# Patient Record
Sex: MALE
[De-identification: names, ages, dates, MRNs, and addresses within clinical notes are randomized per-mention and may not be internally consistent; named-entity substitution may affect disease eponyms.]

---

## 2018-02-10 ENCOUNTER — HOSPITAL ENCOUNTER (EMERGENCY)
Dept: HOSPITAL 42 - ED | Age: 83
Discharge: HOME | End: 2018-02-10
Payer: MEDICARE

## 2018-02-10 VITALS — TEMPERATURE: 97.5 F | OXYGEN SATURATION: 99 % | RESPIRATION RATE: 18 BRPM

## 2018-02-10 DIAGNOSIS — R41.82: Primary | ICD-10-CM

## 2018-02-10 DIAGNOSIS — G20: ICD-10-CM

## 2018-02-10 NOTE — ED PDOC
Arrival/HPI





- General


Time Seen by Provider: 02/10/18 21:53


Historian: Patient, Spouse





- History of Present Illness


Narrative History of Present Illness (Text): 


you were treated in the ED today for having parkinson's, having progressive 

forgetfulness, and you got out of the house and got lost and brought by the 

authorities and you were otherwise without any head injury/neck pain/fever/

nausea/vomiting/headache/dizziness/difficulty breathing/chest pain/abdomen pain/

numbness/tingling/loss of limb function/thoughts to harm yourself or others or 

hallucinations. you were sitting up, comfortable, alert/oriented to your 

baseline of name/location but not year which your wife/family confirmed, good 

strength/sensation, no abdomen tenderness, pink skin, no fever temp 97.5, 

stable heart rate 80, stable breathing rate 18, excellent oxygen level 99% room 

air, elevated blood pressure 147/78 which we recommend followup primary care 2-

3 days repeat and determine further treatment, observation done in the ED, 

counselled to complete a labwork-up, radiology work-up, ECG but you and your 

family wanted to go home and cautioned for missed diagnosis/complications/death 

but you stated you will followup with your primary care physician 2 days, and 

you went home with wife and family. 1. recommend followup primary care 1-2 days 

to determine further care, referral to neurology clinic. 2. if any worsening 

pain, fever, chills, nausea, vomiting, any medical condition then return to the 

ED.


02/10/18 22:50





02/10/18 22:50





Time/Duration: 1-3 hours


Symptom Onset: Gradual


Symptom Course: Improving


Activities at Onset: Rest


Context: Walking





Past Medical History





- Provider Review


Nursing Documentation Reviewed: Yes





- Travel History


Have you recently traveled outside [place] w/in the past 3 mons?: No





Family/Social History





- Physician Review


Nursing Documentation Reviewed: Yes


Family/Social History: No Known Family HX





Review of Systems





- Review of Systems


Constitutional: Normal


Eyes: Normal


ENT: Normal


Respiratory: Normal


Cardiovascular: Normal


Gastrointestinal: Normal


Genitourinary Male: Normal


Musculoskeletal: Normal


Skin: Normal


Neurological: Other (forgetfullness)


Endocrine: Normal


Hemo/Lymphatic: Normal


Psychiatric: Normal





Physical Exam


Vital Signs Reviewed: Yes


Vital Signs











  Temp Pulse Resp BP Pulse Ox


 


 02/10/18 21:51  97.5 F L  80  18  147/70  99











Temperature: Afebrile


Blood Pressure: Hypertensive


Pulse: Regular


Respiratory Rate: Normal


Appearance: Positive for: Well-Appearing, Non-Toxic, Comfortable


Pain Distress: None


Mental Status: Positive for: other (alert to name/location but stated year 1918 

and family stated this is baseline)





- Systems Exam


Head: Present: Atraumatic, Normocephalic


Pupils: Present: PERRL


Extroacular Muscles: Present: EOMI


Conjunctiva: Present: Normal


Ears: Present: Normal


Mouth: Present: Moist Mucous Membranes


Pharnyx: Present: Normal


Nose (External): Present: Atraumatic


Nose (Internal): Present: Normal Inspection


Neck: Present: Normal Range of Motion, Other


Respiratory/Chest: Present: Clear to Auscultation, Good Air Exchange


Cardiovascular: Present: Regular Rate and Rhythm


Abdomen: No: Tenderness, Distention, Normal Bowel Sounds, Peritoneal Signs, 

Rebound, Guarding, McBurney's Point Tender, Rovsing's Sign Present, Hernias, 

Feeding Tubes, Ostomy Tubes, Mass/Organomegaly, Scars, Other


Back: Present: Normal Inspection, Other (no c-t-l spinal or paraspinal 

tenderness)


Upper Extremity: Present: Normal Inspection


Lower Extremity: Present: Normal Inspection


Neurological: Present: GCS=15, CN II-XII Intact, Speech Normal, Motor Func 

Grossly Intact


Skin: Present: Warm, Normal Color


Psychiatric: Present: Alert, Normal Insight, Normal Concentration, Other (

baseline mental status)





Medical Decision Making


ED Course and Treatment: 


you were treated in the ED today for having parkinson's, having progressive 

forgetfulness, and you got out of the house and got lost and brought by the 

authorities and you were otherwise without any head injury/neck pain/fever/

nausea/vomiting/headache/dizziness/difficulty breathing/chest pain/abdomen pain/

numbness/tingling/loss of limb function/thoughts to harm yourself or others or 

hallucinations. you were sitting up, comfortable, alert/oriented to your 

baseline of name/location but not year which your wife/family confirmed, good 

strength/sensation, no abdomen tenderness, laughing/joking with family, pink 

skin, no fever temp 97.5, stable heart rate 80, stable breathing rate 18, 

excellent oxygen level 99% room air, elevated blood pressure 147/78 which we 

recommend followup primary care 2-3 days repeat and determine further treatment

, observation done in the ED, counselled to complete a labwork-up, radiology 

work-up, ECG but you and your family wanted to go home and cautioned for missed 

diagnosis/complications/death but you stated you will followup with your 

primary care physician 2 days, and you went home with wife and family. 1. 

recommend followup primary care 1-2 days to determine further care, referral to 

neurology clinic. 2. if any worsening pain, fever, chills, nausea, vomiting, 

any medical condition then return to the ED.


02/10/18 22:55





Reassessment Condition: Improved





Disposition/Present on Arrival





- Present on Arrival


Any Indicators Present on Arrival: No





- Disposition


Have Diagnosis and Disposition been Completed?: Yes


Diagnosis: 


 Forgetfulness





Disposition: HOME/ ROUTINE


Disposition Time: 22:56


Patient Plan: Discharge


Condition: IMPROVED


Additional Instructions: 


you were treated in the ED today for having parkinson's, having progressive 

forgetfulness, and you got out of the house and got lost and brought by the 

authorities and you were otherwise without any head injury/neck pain/fever/

nausea/vomiting/headache/dizziness/difficulty breathing/chest pain/abdomen pain/

numbness/tingling/loss of limb function/thoughts to harm yourself or others or 

hallucinations. you were sitting up, comfortable, alert/oriented to your 

baseline of name/location but not year which your wife/family confirmed, good 

strength/sensation, no abdomen tenderness, laughing/joking with family, pink 

skin, no fever temp 97.5, stable heart rate 80, stable breathing rate 18, 

excellent oxygen level 99% room air, elevated blood pressure 147/78 which we 

recommend followup primary care 2-3 days repeat and determine further treatment

, observation done in the ED, counselled to complete a labwork-up, radiology 

work-up, ECG but you and your family wanted to go home and cautioned for missed 

diagnosis/complications/death but you stated you will followup with your 

primary care physician 2 days, and you went home with wife and family. 1. 

recommend followup primary care 1-2 days to determine further care, referral to 

neurology clinic. 2. if any worsening pain, fever, chills, nausea, vomiting, 

any medical condition then return to the ED.


Referrals: 


Monica Wood, [Primary Care Provider] - Follow up with primary

## 2018-02-11 VITALS — HEART RATE: 78 BPM | SYSTOLIC BLOOD PRESSURE: 137 MMHG | DIASTOLIC BLOOD PRESSURE: 70 MMHG

## 2018-06-29 ENCOUNTER — HOSPITAL ENCOUNTER (OUTPATIENT)
Dept: HOSPITAL 42 - ED | Age: 83
Setting detail: OBSERVATION
LOS: 1 days | Discharge: HOME | End: 2018-06-30
Attending: INTERNAL MEDICINE | Admitting: INTERNAL MEDICINE
Payer: MEDICARE

## 2018-06-29 VITALS — HEART RATE: 74 BPM

## 2018-06-29 VITALS — BODY MASS INDEX: 20.3 KG/M2

## 2018-06-29 DIAGNOSIS — F02.80: ICD-10-CM

## 2018-06-29 DIAGNOSIS — I10: ICD-10-CM

## 2018-06-29 DIAGNOSIS — Z95.5: ICD-10-CM

## 2018-06-29 DIAGNOSIS — I25.2: ICD-10-CM

## 2018-06-29 DIAGNOSIS — E78.5: ICD-10-CM

## 2018-06-29 DIAGNOSIS — G20: ICD-10-CM

## 2018-06-29 DIAGNOSIS — E86.0: Primary | ICD-10-CM

## 2018-06-29 DIAGNOSIS — K59.00: ICD-10-CM

## 2018-06-29 DIAGNOSIS — E11.9: ICD-10-CM

## 2018-06-29 DIAGNOSIS — E03.9: ICD-10-CM

## 2018-06-29 DIAGNOSIS — I25.10: ICD-10-CM

## 2018-06-29 LAB
ALBUMIN SERPL-MCNC: 3.6 G/DL (ref 3–4.8)
ALBUMIN/GLOB SERPL: 1.3 {RATIO} (ref 1.1–1.8)
ALT SERPL-CCNC: 15 U/L (ref 7–56)
APPEARANCE UR: CLEAR
APTT BLD: 31.5 SECONDS (ref 25.1–36.5)
AST SERPL-CCNC: 20 U/L (ref 17–59)
BACTERIA #/AREA URNS HPF: (no result) /[HPF]
BASE EXCESS BLDV CALC-SCNC: 4.8 MMOL/L (ref 0–2)
BASOPHILS # BLD AUTO: 0.01 K/MM3 (ref 0–2)
BASOPHILS NFR BLD: 0.2 % (ref 0–3)
BILIRUB UR-MCNC: NEGATIVE MG/DL
BUN SERPL-MCNC: 15 MG/DL (ref 7–21)
CALCIUM SERPL-MCNC: 9.1 MG/DL (ref 8.4–10.5)
COLOR UR: YELLOW
EOSINOPHIL # BLD: 0.2 10*3/UL (ref 0–0.7)
EOSINOPHIL NFR BLD: 2.5 % (ref 1.5–5)
ERYTHROCYTE [DISTWIDTH] IN BLOOD BY AUTOMATED COUNT: 14 % (ref 11.5–14.5)
GFR NON-AFRICAN AMERICAN: > 60
GLUCOSE UR STRIP-MCNC: NEGATIVE MG/DL
GRANULOCYTES # BLD: 4.17 10*3/UL (ref 1.4–6.5)
GRANULOCYTES NFR BLD: 66.2 % (ref 50–68)
HGB BLD-MCNC: 13 G/DL (ref 14–18)
INR PPP: 1.25 (ref 0.93–1.08)
LEUKOCYTE ESTERASE UR-ACNC: NEGATIVE LEU/UL
LYMPHOCYTES # BLD: 1.5 10*3/UL (ref 1.2–3.4)
LYMPHOCYTES NFR BLD AUTO: 24.1 % (ref 22–35)
MCH RBC QN AUTO: 30.5 PG (ref 25–35)
MCHC RBC AUTO-ENTMCNC: 32.8 G/DL (ref 31–37)
MCV RBC AUTO: 93 FL (ref 80–105)
MONOCYTES # BLD AUTO: 0.4 10*3/UL (ref 0.1–0.6)
MONOCYTES NFR BLD: 7 % (ref 1–6)
PH BLDV: 7.34 [PH] (ref 7.32–7.43)
PH UR STRIP: 6 [PH] (ref 4.7–8)
PLATELET # BLD: 200 10^3/UL (ref 120–450)
PMV BLD AUTO: 8.8 FL (ref 7–11)
PROT UR STRIP-MCNC: (no result) MG/DL
PROTHROMBIN TIME: 14.4 SECONDS (ref 9.4–12.5)
RBC # BLD AUTO: 4.26 10^6/UL (ref 3.5–6.1)
RBC # UR STRIP: NEGATIVE /UL
RBC #/AREA URNS HPF: NEGATIVE /HPF (ref 0–2)
SP GR UR STRIP: 1.02 (ref 1–1.03)
TROPONIN I SERPL-MCNC: < 0.01 NG/ML
UROBILINOGEN UR STRIP-ACNC: 1 E.U./DL
VENOUS BLOOD FIO2: 21 %
VENOUS BLOOD GAS PCO2: 60 (ref 40–60)
VENOUS BLOOD GAS PO2: 42 MM/HG (ref 30–55)
WBC # BLD AUTO: 6.3 10^3/UL (ref 4.5–11)

## 2018-06-29 PROCEDURE — 83615 LACTATE (LD) (LDH) ENZYME: CPT

## 2018-06-29 PROCEDURE — 99285 EMERGENCY DEPT VISIT HI MDM: CPT

## 2018-06-29 PROCEDURE — 83735 ASSAY OF MAGNESIUM: CPT

## 2018-06-29 PROCEDURE — 82948 REAGENT STRIP/BLOOD GLUCOSE: CPT

## 2018-06-29 PROCEDURE — 70450 CT HEAD/BRAIN W/O DYE: CPT

## 2018-06-29 PROCEDURE — 82550 ASSAY OF CK (CPK): CPT

## 2018-06-29 PROCEDURE — 96361 HYDRATE IV INFUSION ADD-ON: CPT

## 2018-06-29 PROCEDURE — 36415 COLL VENOUS BLD VENIPUNCTURE: CPT

## 2018-06-29 PROCEDURE — 87040 BLOOD CULTURE FOR BACTERIA: CPT

## 2018-06-29 PROCEDURE — 80048 BASIC METABOLIC PNL TOTAL CA: CPT

## 2018-06-29 PROCEDURE — 93005 ELECTROCARDIOGRAM TRACING: CPT

## 2018-06-29 PROCEDURE — 81001 URINALYSIS AUTO W/SCOPE: CPT

## 2018-06-29 PROCEDURE — 82803 BLOOD GASES ANY COMBINATION: CPT

## 2018-06-29 PROCEDURE — 85025 COMPLETE CBC W/AUTO DIFF WBC: CPT

## 2018-06-29 PROCEDURE — 71045 X-RAY EXAM CHEST 1 VIEW: CPT

## 2018-06-29 PROCEDURE — 85610 PROTHROMBIN TIME: CPT

## 2018-06-29 PROCEDURE — 85730 THROMBOPLASTIN TIME PARTIAL: CPT

## 2018-06-29 PROCEDURE — 84484 ASSAY OF TROPONIN QUANT: CPT

## 2018-06-29 PROCEDURE — 96374 THER/PROPH/DIAG INJ IV PUSH: CPT

## 2018-06-29 PROCEDURE — 80053 COMPREHEN METABOLIC PANEL: CPT

## 2018-06-29 NOTE — RAD
HISTORY:

AMS  



COMPARISON:

No prior. 



FINDINGS:



LUNGS:

Mild bibasilar atelectasis



PLEURA:

No significant pleural effusion identified, no pneumothorax apparent.



CARDIOVASCULAR:

Normal.



OSSEOUS STRUCTURES:

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 

The Posterior fixation hardware over the upper lumbar spine is 

present.



VISUALIZED UPPER ABDOMEN:

Normal.



OTHER FINDINGS:

None.



IMPRESSION:

Mild bibasilar atelectasis.

## 2018-06-29 NOTE — CARD
--------------- APPROVED REPORT --------------





EKG Measurement

Heart [id number]

MN [id number]

[id number]

[id number]

[id number]



<Conclusion>

Sinus rhythm with 1st degree AV block

Otherwise normal ECG

## 2018-06-29 NOTE — CT
PROCEDURE:  CT HEAD WITHOUT CONTRAST.



HISTORY:

AMS



COMPARISON:

Comparison made with prior MRI of the brain 07/07/2014. 



TECHNIQUE:

Axial computed tomography images were obtained through the head/brain 

without intravenous contrast.  



Radiation dose:



Total exam DLP = 1007.15 MGy-cm.



This CT exam was performed using one or more of the following dose 

reduction techniques: Automated exposure control, adjustment of the 

mA and/or kV according to patient size, and/or use of iterative 

reconstruction technique.



FINDINGS:



HEMORRHAGE:

No acute parenchymal, subarachnoid or extra-axial hemorrhage. 



BRAIN:

Mild chronic periventricular white matter ischemic changes seen 

extending peripherally into the deep and subcortical white matter 

both cerebral hemispheres. Note that the possibility of a small 

hyperacute infarct cannot completely excluded. .  There may also be a 

few tiny chronic bilateral basal nuclei lacunar type infarcts. 



Moderate central volume loss. 



VENTRICLES:

No obstructive hydrocephalus. 



CALVARIUM:

There are no acute calvarial fractures.



PARANASAL SINUSES:

Unremarkable as visualized. No significant inflammatory changes.



MASTOID AIR CELLS:

Left mastoid air complex is sclerotic/underpneumatized.  Residual 

left-sided mastoid air cells are opacified. .



OTHER FINDINGS:

None.



IMPRESSION:

No acute intracranial hemorrhage.  



Mild chronic white matter ischemic changes. .  Note that the 

possibility of a small hyperacute infarct cannot be excluded.  

Clinical correlation recommended.



Moderate central volume loss

## 2018-06-29 NOTE — ED PDOC
Arrival/HPI





- General


Historian: Patient





- History of Present Illness


Time/Duration: < week


Symptom Onset: Gradual


Symptom Course: Unchanged


Context: Home





- General


Chief Complaint: Weakness/Neurological Deficit


Time Seen by Provider: 06/29/18 09:07





- History of Present Illness


Narrative History of Present Illness (Text): 





06/29/18 09:33


Pt is an 87 yo M with PMH of HTN, CAD s/p 2 stents, hypothyroidism, Parkinson's

, dementia, DM, and urinary incontinence sent to ED by PMD for lethargy. 

Patient is poor historian and only oriented to self. Patient's wife at bedside 

provided history. She states that over the 4-5 days he has had poor urine 

output. PMD was contacted and patient was prescribed ciprofloxacin, which he 

completed, but symptoms did not resolve. At baseline, patient is able to 

ambulate and converse with some limitations. However, patient has been less 

talkative over the last few days. ROS limited due to patient's current mental 

status.





PMD: Abdoulaye Schofield)





Past Medical History





- Provider Review


Nursing Documentation Reviewed: Yes





- Infectious Disease


Hx of Infectious Diseases: None





- Cardiac


Hx Cardiac Disorders: Yes


Hx Hypertension: Yes





- Pulmonary


Hx Respiratory Disorders: No





- Neurological


Hx Neurological Disorder: Yes


Hx Parkinson's Disease: Yes





- HEENT


Hx HEENT Disorder: No





- Renal


Hx Renal Disorder: No





- Endocrine/Metabolic


Hx Endocrine Disorders: Yes


Hx Diabetes Mellitus Type 2: Yes





- Hematological/Oncological


Hx Blood Disorders: No





- Integumentary


Hx Dermatological Disorder: No





- Musculoskeletal/Rheumatological


Hx Musculoskeletal Disorders: Yes


Hx Falls: Yes


Hx Unsteady Gait: Yes





- Gastrointestinal


Hx Gastrointestinal Disorders: Yes


Hx Gall Bladder Disease: Yes





- Genitourinary/Gynecological


Hx Genitourinary Disorders: Yes


Hx Urinary Tract Infection: Yes





- Psychiatric


Hx Psychophysiologic Disorder: No


Hx Substance Use: No





- Surgical History


Hx Cholecystectomy: Yes





Family/Social History





- Physician Review


Nursing Documentation Reviewed: Yes


Family/Social History: No Known Family HX


Smoking Status: Unknown If Ever Smoked


Hx Alcohol Use: No


Hx Substance Use: No





Allergies/Home Meds


Allergies/Adverse Reactions: 


Allergies





No Known Allergies Allergy (Verified 06/29/18 09:24)


 








Home Medications: 


 Home Meds











 Medication  Instructions  Recorded  Confirmed


 


Aspirin [Aspirin Chewable] 81 mg PO DAILY 06/29/18 06/29/18


 


Carbidopa/Levodopa 1 tab PO TID 06/29/18 06/29/18





[Carbidopa-Levodopa  Tab]   


 


Clonazepam 0.5 mg PO BID 06/29/18 06/29/18


 


Donepezil [Aricept] 10 mg PO DAILY 06/29/18 06/29/18


 


Haloperidol [Haldol] 0.5 mg PO DAILY 06/29/18 06/29/18


 


Levothyroxine [Synthroid] 0.075 mg PO DAILY 06/29/18 06/29/18


 


Losartan [Cozaar] 50 mg PO DAILY 06/29/18 06/29/18


 


Memantine [Namenda] 10 mg PO BID 06/29/18 06/29/18


 


Omeprazole Magnesium [Prilosec] 20 mg PO DAILY 06/29/18 06/29/18


 


Oxybutynin [Oxybutynin Chloride] 5 mg PO BID 06/29/18 06/29/18


 


Tamsulosin [Flomax] 0.4 mg PO DAILY 06/29/18 06/29/18


 


metFORMIN [glucOPHAGE] 500 mg PO BID 06/29/18 06/29/18














Review of Systems





- Review of Systems


Systems not reviewed;Unavailable: Altered Mental Status





Physical Exam





- Physical Exam


Physical Exam Limitations: Altered Mental Status


Vital Signs Reviewed: Yes


Temperature: Afebrile


Blood Pressure: Normal


Pulse: Regular


Respiratory Rate: Normal


Appearance: Positive for: Ill-Appearing, Cachectic


Pain Distress: None


Mental Status: Positive for: Confused, Lethargic





- Systems Exam


Head: Present: Atraumatic, Normocephalic


Conjunctiva: Present: Normal


Mouth: No: Moist Mucous Membranes (dry)


Neck: Present: Trachea Midline.  No: MIDLINE TENDERNESS, Paraspinal Tenderness


Respiratory/Chest: Present: Clear to Auscultation, Good Air Exchange.  No: 

Respiratory Distress, Accessory Muscle Use, Wheezes, Rales, Rhonchi


Cardiovascular: Present: Regular Rate and Rhythm, Normal S1, S2.  No: Murmurs


Abdomen: No: Tenderness, Distention, Peritoneal Signs, Rebound, Guarding


Back: Present: Normal Inspection


Upper Extremity: Present: Normal Inspection.  No: Cyanosis, Edema


Lower Extremity: Present: Normal Inspection.  No: Edema


Neurological: No: GCS=15 (12), Speech Normal


Skin: Present: Warm, Dry, Normal Color, Other (poor skin turgor).  No: Rashes


Psychiatric: Present: Alert, Normal Insight, Normal Concentration.  No: 

Oriented x 3 (to self only)





Vital Signs











  Temp Pulse Resp BP Pulse Ox


 


 06/29/18 12:22   63  16  113/69  99


 


 06/29/18 09:18  98 F  66  18  127/63  97














Medical Decision Making


ED Course and Treatment: 





06/29/18 09:54


87 yo M presents with lethargy.





Plan:


- CBC, CMP


- Coags


- Cardiac ISO


- Cultures


- VBG shock


- UA


- EKG


- CXR


- Head CT


- IVF


- Reassess and disposition





06/29/18 09:55


EKG reviewed shows rate of 66, sinus rhythm with 1st degree AV block.





06/29/18 10:22


Chest X-ray


Impression: Mild bibasilar atelectasis.





06/29/18 11:44


Head CT without contrast


Impression: No acute intracranial hemorrhage. Mild chronic white matter 

ischemic changes. Note that the possibility of a small hyperacute infarct 

cannot be excluded. Clinical correlation recommended. Moderate central volume 

loss.





Labwork and imaging grossly normal. Case discussed with Dr. Galarza, who 

requests overnight observation under his service due to altered mental status. 

Patient admitted for observation to med/surg.


 (Abdoulaye Lauren)








06/29/18 14:31


pts sent in by pmd for eval for ams. labs ct neg. ua neg. pmd requests obs. 

vitals stable pt dni dnr (Earl Moore)





- Lab Interpretations


Lab Results: 











 06/29/18 10:00 





 06/29/18 10:00 





 Lab Results





06/29/18 10:00: pO2 42, VBG pH 7.34, VBG pCO2 60.0, VBG HCO3 32.4 H, VBG Total 

CO2 34.2 H, VBG O2 Sat (Calc) 83.4 H, VBG Base Excess 4.8 H, VBG Potassium 3.7, 

Sodium 140.0, Chloride 105.0, Glucose 94, Lactate 0.9, FiO2 21.0, Venous Blood 

Potassium 3.7


06/29/18 10:00: PT 14.4 H, INR 1.25 H, APTT 31.5


06/29/18 10:00: Sodium 143, Chloride 104, Potassium 3.9, Carbon Dioxide 30, 

Anion Gap 13, BUN 15, Creatinine 0.8, Est GFR (African Amer) > 60, Est GFR (Non-

Af Amer) > 60, Random Glucose 96, Calcium 9.1, Magnesium 2.1, Total Bilirubin 

0.9, AST 20, ALT 15, Alkaline Phosphatase 55, Lactate Dehydrogenase 327 L, 

Total Creatine Kinase 40, Troponin I < 0.01, Total Protein 6.3, Albumin 3.6, 

Globulin 2.7, Albumin/Globulin Ratio 1.3


06/29/18 10:00: WBC 6.3, RBC 4.26, Hgb 13.0 L, Hct 39.6 L, MCV 93.0, MCH 30.5, 

MCHC 32.8, RDW 14.0, Plt Count 200, MPV 8.8, Gran % 66.2, Lymph % (Auto) 24.1, 

Mono % (Auto) 7.0 H, Eos % (Auto) 2.5, Baso % (Auto) 0.2, Gran # 4.17, Lymph # (

Auto) 1.5, Mono # (Auto) 0.4, Eos # (Auto) 0.2, Baso # (Auto) 0.01


06/29/18 09:33: POC Glucose (mg/dL) 90


06/29/18 09:26: Urine Color Yellow, Urine Appearance Clear, Urine pH 6.0, Ur 

Specific Gravity 1.025, Urine Protein Trace H, Urine Glucose (UA) Negative, 

Urine Ketones Trace H, Urine Blood Negative, Urine Nitrate Negative, Urine 

Bilirubin Negative, Urine Urobilinogen 1.0 H, Ur Leukocyte Esterase Negative, 

Urine RBC Negative, Urine WBC 2 - 5, Ur Epithelial Cells 3 - 4, Urine Bacteria 

Few











- RAD Interpretation


Radiology Orders: 











06/29/18 09:28


CHEST PORTABLE [RAD] Stat 





06/29/18 09:36


HEAD W/O CONTRAST [CT] Stat 














- Medication Orders


Current Medication Orders: 











Aspirin (Aspirin Chewable)  81 mg PO DAILY [facility]


Carbidopa/Levodopa (Sinemet)  1 tab PO 0800,1000,1400,1600 [facility]


   Last Admin: 06/29/18 13:40  Dose: 1 tab





Donepezil HCl (Aricept)  10 mg PO HS [facility]


Sodium Chloride (Sodium Chloride 0.9%)  1,000 mls @ 60 mls/hr IV .[id number] [facility]


   Last Admin: 06/29/18 13:40  Dose: 60 mls/hr





eMAR Start Stop


 Document     06/29/18 13:40  RV  (Rec: 06/29/18 13:40  RV  [id number])


     Intravenous Solution


      Start Date                                 06/29/18


      Start Time                                 13:40





Levothyroxine Sodium (Synthroid)  75 mcg PO 0600 [facility]


Losartan Potassium (Cozaar)  50 mg PO DAILY [facility]


Memantine (Namenda)  10 mg PO BID [facility]


Metformin HCl (Glucophage)  500 mg PO BID [facility]


Pantoprazole Sodium (Protonix Inj)  40 mg IVP DAILY [facility]


Tamsulosin HCl (Flomax)  0.4 mg PO DAILY [facility]





Discontinued Medications





Carbidopa/Levodopa (Sinemet)  1 tab PO STAT [facility]


   Stop: 06/29/18 11:14


   Last Admin: 06/29/18 11:51  Dose: 1 tab





Sodium Chloride (Sodium Chloride 0.9%)  1,000 mls @ 999 mls/hr IV .Q1H1M [facility]


   Stop: 06/29/18 10:35


   Last Admin: 06/29/18 09:47  Dose: 999 mls/hr





eMAR Start Stop


 Document     06/29/18 09:47  JASMYN  (Rec: 06/29/18 09:49  JASMYNAsc[facility] Stand[facility] [id number])


     Intravenous Solution


      Start Date                                 06/29/18


      Start Time                                 09:48


      End Date                                   06/29/18


      End time                                   10:48


      Total Infusion Time                        60





Sodium Chloride (Sodium Chloride 0.9%)  1,000 mls @ 100 mls/hr IV .Q10H [facility]


   Last Admin: 06/29/18 11:52  Dose: 100 mls/hr





eMAR Start Stop


 Document     06/29/18 11:52  JASMYN  (Rec: 06/29/18 11:52  JASMYN  [facility] [id number])


     Intravenous Solution


      Start Date                                 06/29/18


      Start Time                                 11:52














Disposition/Present on Arrival





- Present on Arrival


Any Indicators Present on Arrival: No


History of DVT/PE: No


History of Uncontrolled Diabetes: No


Urinary Catheter: No


History of Decub. Ulcer: No


History Surgical Site Infection Following: None





- Disposition


Have Diagnosis and Disposition been Completed?: Yes


Disposition Time: 12:11


Patient Plan: Admission





- Disposition


Diagnosis: 


 Altered mental status





Disposition: HOSPITALIZED


Condition: STABLE

## 2018-06-30 VITALS — RESPIRATION RATE: 18 BRPM | TEMPERATURE: 97.5 F | OXYGEN SATURATION: 98 %

## 2018-06-30 VITALS — DIASTOLIC BLOOD PRESSURE: 70 MMHG | SYSTOLIC BLOOD PRESSURE: 118 MMHG

## 2018-06-30 LAB
BASOPHILS # BLD AUTO: 0.01 K/MM3 (ref 0–2)
BASOPHILS NFR BLD: 0.1 % (ref 0–3)
BUN SERPL-MCNC: 11 MG/DL (ref 7–21)
CALCIUM SERPL-MCNC: 8.6 MG/DL (ref 8.4–10.5)
EOSINOPHIL # BLD: 0 10*3/UL (ref 0–0.7)
EOSINOPHIL NFR BLD: 0 % (ref 1.5–5)
ERYTHROCYTE [DISTWIDTH] IN BLOOD BY AUTOMATED COUNT: 13.6 % (ref 11.5–14.5)
GFR NON-AFRICAN AMERICAN: > 60
GRANULOCYTES # BLD: 7.06 10*3/UL (ref 1.4–6.5)
GRANULOCYTES NFR BLD: 84.7 % (ref 50–68)
HGB BLD-MCNC: 13.2 G/DL (ref 14–18)
LYMPHOCYTES # BLD: 0.6 10*3/UL (ref 1.2–3.4)
LYMPHOCYTES NFR BLD AUTO: 7.2 % (ref 22–35)
MCH RBC QN AUTO: 30.6 PG (ref 25–35)
MCHC RBC AUTO-ENTMCNC: 33.2 G/DL (ref 31–37)
MCV RBC AUTO: 91.9 FL (ref 80–105)
MONOCYTES # BLD AUTO: 0.7 10*3/UL (ref 0.1–0.6)
MONOCYTES NFR BLD: 8 % (ref 1–6)
PLATELET # BLD: 203 10^3/UL (ref 120–450)
PMV BLD AUTO: 9.1 FL (ref 7–11)
RBC # BLD AUTO: 4.32 10^6/UL (ref 3.5–6.1)
WBC # BLD AUTO: 8.3 10^3/UL (ref 4.5–11)

## 2018-07-02 NOTE — HP
HISTORY OF PRESENT ILLNESS:  This is an 88-year-old man I have known for

more than 30 years, with a history of severe Parkinson disease and

moderate-to-severe dementia, who was home cared for by his wife and 2

health aides.  The patient's wife called me on the morning of admission,

saying that he was not in his usual self, he was not talkative, more

lethargic, concerned for dehydration and/or infection, he was sent to the

emergency room.  Workup in the emergency room was done and patient was

admitted for mild dehydration and altered mental status as the wife reports

he had little to no p.o. intake of food or fluids for the last 24 hours.



PAST MEDICAL HISTORY:  Significant for hypertension since , diabetes

since , hyperlipidemia treated since .  There is no history of

tuberculosis, asthma, seizures, gout, emphysema, stroke or TIAs.  He had an

MI in  and a history of coronary artery disease, but no history of any

kinds of cancer.  He was diagnosed with hypothyroidism in , developed

exertional tremor in the last several years, then diagnosed with

Parkinson's.



PAST SURGICAL HISTORY:  Significant for right partial thyroidectomy for

goiter in .  He had an MI and PTCA in .  He had a lumbar spine

fusion in  at [facility].  He is status post

cholecystectomy in  and hospitalized in  with GI bleed, at which

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  He had a colonoscopy in  and ,

endoscopy in , and cardiac catheterization in .



ALLERGIES:  HE IS ALLERGIC TO VASOTEC AND PREVPAC WHICH GIVE HIM A RASH.



SOCIAL HISTORY:  He does not smoke, never did; does not alcohol, only

drinks decaf coffee.  He gets the flu shot yearly and his Pneumovax 23 was

in .  He is , with one son and two daughters, 6 grandchildren

_____ .  He retired from [employer] in [place] in .



FAMILY HISTORY:  His mother  at 65, his father  when the patient is

only 3 months old in 1929.  He is fifth of 5 siblings and he is the last

survivor.



Problem list in the office shows hypertension, diabetes and Parkinson

disease.  His _____ .  His cardiologist is Dr. Mariscal.



HOME MEDICATIONS:  Include levothyroxine, haloperidol, donepezil,

clonazepam, Sinemet, oxybutynin, omeprazole, Namenda, Cozaar, metformin,

tamsulosin and aspirin.



REVIEW OF SYSTEMS:  Not obtainable because of his Parkinson disease and

moderate dementia on top of this acute admission for altered mental status,

not talking and dehydration.



PHYSICAL EXAMINATION:

GENERAL:  The patient was seen this Friday morning in the emergency room,

slot #2, with his wife at the bedside.  He is lethargic and groggy, but

recognized me when aroused, answers simple yes/no answers which is off from

his baseline.

HEENT:  Conjunctivae are pink.  Mucous membranes are dry.

NECK:  Supple without masses.  Thyroid is not palpable.  There is no JVD. 

No carotid bruits.

LUNGS:  Show good aeration right and left.

HEART:  Not tachycardic.

ABDOMEN:  Soft, nontender.

EXTREMITIES:  Show no edema.

NEUROLOGIC:  Show no focal motor deficit.



IMPRESSION:

1.  Altered mental status, rule out dehydration, rule out primary

neurologic event, rule out medication related lethargy.

2.  Mild dehydration with dry mucous membranes and poor p.o. intake.

3.  History of hypertension.

4.  History of diabetes.

5.  Coronary artery disease.

6.  Parkinson disease.



PLAN:  Patient will be admitted for overnight observation.  IV fluids will

continue as he seems to be improving during his brief stay in the emergency

room, and reassess the situation in the morning and hopefully with clinical

improvement, he will be ready for discharge to home soon.





__________________________________________

Keith Galarza MD



DD:  2018 15:27:31

DT:  2018 19:46:48

Job # [id number]

## 2018-07-02 NOTE — DS
HOSPITAL COURSE:  This is an 88-year-old man I have known for more than 30

years with regards to the Parkinson disease and moderate advanced dementia.

He presented to the Emergency Room on Friday morning with altered mental

status, not eating or drinking for approximately 24 hours and becoming more

lethargic and obtunded.  In the Emergency Room, he was clinically dry and

admitted for overnight observation.  His labs were essentially

unremarkable.  X-rays from the _____ were equally unremarkable.  The

patient was treated with IV fluids.  He was constipated.  In the

differential diagnosis, there was concern of medications taken as he did

have haloperidol and clonazepam at home, both of which had worked

unreasonably well for his intermittent episodes of restlessness and

agitation, those medicines were held, fluids were given.  The patient

became a bit restless at night so Seroquel 12.5 mg was given to prevent

some relief as well as 1:1 sitter to stay with him, but following morning

Saturday on 06/30/2018, he had markedly improved.  When I came to the room,

he was much more awake, much more alert, recognized me, a bit more

talkative although at times, confused and this is near his baseline because

of the underlying Parkinson's and mild dementia.  He was ready for

discharge to home.  I spoke with his wife, they will make arrangements to

come and bring him home and he is discharged.



FINAL DISCHARGE DIAGNOSES:

1.  Altered mental status due to suspected medication reaction.

2.  Dehydration.

3.  Constipation.

4.  Hypertension.

5.  Diabetes.

6.  Coronary artery disease.

7.  Hypothyroidism.

8.  Parkinson disease.



PLAN:  Discharge to home with prescription for quetiapine (Seroquel), was

called to his pharmacy.  Medications were discussed with family, they were

asked to hold off on benzodiazepines and haloperidol and see if the

quetiapine would be optimal for him.  I will see him either in the office

or on a home visit within the following week.







__________________________________________

Keith Galarza MD



DD:  07/01/2018 15:34:03

DT:  07/02/2018 10:00:00

Job # [id number]